# Patient Record
Sex: MALE | Race: AMERICAN INDIAN OR ALASKA NATIVE | URBAN - NONMETROPOLITAN AREA
[De-identification: names, ages, dates, MRNs, and addresses within clinical notes are randomized per-mention and may not be internally consistent; named-entity substitution may affect disease eponyms.]

---

## 2018-12-19 ENCOUNTER — NEW PATIENT (OUTPATIENT)
Dept: URBAN - NONMETROPOLITAN AREA CLINIC 15 | Facility: CLINIC | Age: 28
End: 2018-12-19
Payer: OTHER GOVERNMENT

## 2018-12-19 DIAGNOSIS — H52.13 MYOPIA, BILATERAL: Primary | ICD-10-CM

## 2018-12-19 PROCEDURE — 92015 DETERMINE REFRACTIVE STATE: CPT | Performed by: OPTOMETRIST

## 2018-12-19 PROCEDURE — 92004 COMPRE OPH EXAM NEW PT 1/>: CPT | Performed by: OPTOMETRIST

## 2018-12-19 ASSESSMENT — VISUAL ACUITY
OS: 20/20
OD: 20/20

## 2018-12-19 ASSESSMENT — INTRAOCULAR PRESSURE
OS: 22
OD: 18

## 2023-02-08 ENCOUNTER — OFFICE VISIT (OUTPATIENT)
Dept: URBAN - NONMETROPOLITAN AREA CLINIC 14 | Facility: CLINIC | Age: 33
End: 2023-02-08
Payer: OTHER GOVERNMENT

## 2023-02-08 DIAGNOSIS — E11.9 DIABETES MELLITUS TYPE 2 WITHOUT MENTION OF COMPLICATION: ICD-10-CM

## 2023-02-08 DIAGNOSIS — H52.223 REGULAR ASTIGMATISM, BILATERAL: ICD-10-CM

## 2023-02-08 DIAGNOSIS — H52.13 MYOPIA, BILATERAL: Primary | ICD-10-CM

## 2023-02-08 PROCEDURE — 92004 COMPRE OPH EXAM NEW PT 1/>: CPT | Performed by: OPTOMETRIST

## 2023-02-08 ASSESSMENT — INTRAOCULAR PRESSURE
OD: 23
OS: 18

## 2023-02-08 ASSESSMENT — VISUAL ACUITY
OD: 20/20
OS: 20/20

## 2023-02-08 NOTE — IMPRESSION/PLAN
Impression: Diabetes mellitus Type 2 without mention of complication: I17.1. Plan: Diet controlled. Monitor x 1 year. Educated pt. on need for tight BG control, compliance with medication, and regular F/U with PCP.

## 2024-10-17 ENCOUNTER — OFFICE VISIT (OUTPATIENT)
Dept: URBAN - NONMETROPOLITAN AREA CLINIC 14 | Facility: CLINIC | Age: 34
End: 2024-10-17
Payer: OTHER GOVERNMENT

## 2024-10-17 DIAGNOSIS — E11.9 DIABETES MELLITUS TYPE 2 WITHOUT MENTION OF COMPLICATION: Primary | ICD-10-CM

## 2024-10-17 DIAGNOSIS — H52.223 REGULAR ASTIGMATISM, BILATERAL: ICD-10-CM

## 2024-10-17 DIAGNOSIS — H00.014 HORDEOLUM, LEFT UPPER LID: ICD-10-CM

## 2024-10-17 DIAGNOSIS — H52.13 MYOPIA, BILATERAL: ICD-10-CM

## 2024-10-17 PROCEDURE — 92014 COMPRE OPH EXAM EST PT 1/>: CPT | Performed by: OPTOMETRIST

## 2024-10-17 RX ORDER — NEOMYCIN SULFATE, POLYMYXIN B SULFATE AND DEXAMETHASONE 3.5; 10000; 1 MG/G; [USP'U]/G; MG/G
OINTMENT OPHTHALMIC
Qty: 3.5 | Refills: 0 | Status: ACTIVE
Start: 2024-10-17

## 2024-10-17 ASSESSMENT — VISUAL ACUITY
OD: 20/20
OS: 20/20

## 2024-10-17 ASSESSMENT — INTRAOCULAR PRESSURE
OS: 20
OD: 22